# Patient Record
Sex: FEMALE | Race: WHITE | NOT HISPANIC OR LATINO
[De-identification: names, ages, dates, MRNs, and addresses within clinical notes are randomized per-mention and may not be internally consistent; named-entity substitution may affect disease eponyms.]

---

## 2023-07-26 PROBLEM — Z00.00 ENCOUNTER FOR PREVENTIVE HEALTH EXAMINATION: Status: ACTIVE | Noted: 2023-07-26

## 2023-07-28 ENCOUNTER — APPOINTMENT (OUTPATIENT)
Dept: ORTHOPEDIC SURGERY | Facility: CLINIC | Age: 68
End: 2023-07-28
Payer: MEDICARE

## 2023-07-28 DIAGNOSIS — I10 ESSENTIAL (PRIMARY) HYPERTENSION: ICD-10-CM

## 2023-07-28 DIAGNOSIS — M67.449 GANGLION, UNSPECIFIED HAND: ICD-10-CM

## 2023-07-28 DIAGNOSIS — M19.042 PRIMARY OSTEOARTHRITIS, LEFT HAND: ICD-10-CM

## 2023-07-28 DIAGNOSIS — E07.9 DISORDER OF THYROID, UNSPECIFIED: ICD-10-CM

## 2023-07-28 DIAGNOSIS — F41.9 ANXIETY DISORDER, UNSPECIFIED: ICD-10-CM

## 2023-07-28 PROCEDURE — 73140 X-RAY EXAM OF FINGER(S): CPT | Mod: LT

## 2023-07-28 PROCEDURE — 99203 OFFICE O/P NEW LOW 30 MIN: CPT | Mod: 25

## 2023-07-28 PROCEDURE — 20612 ASPIRATE/INJ GANGLION CYST: CPT | Mod: LT

## 2023-07-28 RX ORDER — METHIMAZOLE 5 MG/1
TABLET ORAL
Refills: 0 | Status: ACTIVE | COMMUNITY

## 2023-07-28 RX ORDER — ATORVASTATIN CALCIUM 80 MG/1
TABLET, FILM COATED ORAL
Refills: 0 | Status: ACTIVE | COMMUNITY

## 2023-07-28 NOTE — ASSESSMENT
[FreeTextEntry1] : The condition was explained to the patient.\par \par Discussed that arthritis is a progressive degenerative process, and symptoms may have a waxing/waning course, which may be exacerbated by activity or trauma.\par \par Discussed risks and benefits of treatment options for mucous cyst - observation, NSAID, immobilization, aspiration, or surgery. Discussed associated nail plate abnormalities due to pressure from cyst. Discussed risk of infection, which may require antibiotics or surgical debridement.\par Patient would like to proceed with aspiration. Patient understands that there is ~50% chance of recurrence after aspiration.\par - Discussed risks, benefits, and alternatives as well as contents of injection. Risks include, but are not limited allergic reaction, injection site pain, bruising, numbness, tendon rupture, and infection. Patient expressed understanding and would like to proceed with aspiration.\par The skin over the LEFT index finger mucous cyst was cleansed with alcohol/betadine and anesthetized with 0.25cc of 1% lidocaine. The cyst was aspirated with an 18G needle, obtaining a small amount of yellow mucinous fluid. Site was dressed with gauze/coban. Patient tolerated the procedure well.\par \par F/u PRN.\par

## 2023-07-28 NOTE — HISTORY OF PRESENT ILLNESS
[de-identified] : 7/28/23: 68yo RHD female (retired) presents for LEFT index finger mass x 2 months. Tender to the touch.\par Denies injury.\par \par Hx: Anxiety. Hyperthyroidism. HLD. [] : no [FreeTextEntry1] : LEFT index finger  [FreeTextEntry5] : CRISTIANO LANDON a [RHD] 67 year old female is here today c/o LEFT index finger growth. onset of bump ~2 months ago, denies injury. saw Derm due to skin cancer hx, referred to ortho. c/o pain with pressure.